# Patient Record
Sex: MALE | Race: WHITE | URBAN - METROPOLITAN AREA
[De-identification: names, ages, dates, MRNs, and addresses within clinical notes are randomized per-mention and may not be internally consistent; named-entity substitution may affect disease eponyms.]

---

## 2022-07-09 ENCOUNTER — TELEPHONE ENCOUNTER (OUTPATIENT)
Dept: URBAN - METROPOLITAN AREA CLINIC 121 | Facility: CLINIC | Age: 82
End: 2022-07-09

## 2022-07-10 ENCOUNTER — TELEPHONE ENCOUNTER (OUTPATIENT)
Dept: URBAN - METROPOLITAN AREA CLINIC 121 | Facility: CLINIC | Age: 82
End: 2022-07-10

## 2022-07-10 RX ORDER — ATENOLOL 50 MG/1
TABLET ORAL
Refills: 0 | Status: ACTIVE | COMMUNITY
Start: 2010-07-27

## 2022-07-10 RX ORDER — ASPIRIN 81 MG/1
TABLET, COATED ORAL
Refills: 0 | Status: ACTIVE | COMMUNITY
Start: 2010-07-27

## 2022-07-10 RX ORDER — SEVELAMER CARBONATE 800 MG/1
TABLET, FILM COATED ORAL
Refills: 0 | Status: ACTIVE | COMMUNITY
Start: 2010-07-27

## 2022-07-10 RX ORDER — LISINOPRIL 10 MG/1
TABLET ORAL
Refills: 0 | Status: ACTIVE | COMMUNITY
Start: 2010-07-27

## 2022-07-10 RX ORDER — LANTHANUM CARBONATE 1000 MG/1
TABLET, CHEWABLE ORAL
Refills: 0 | Status: ACTIVE | COMMUNITY
Start: 2010-07-27

## 2023-05-02 ENCOUNTER — P2P PATIENT RECORD (OUTPATIENT)
Age: 83
End: 2023-05-02

## 2023-05-03 ENCOUNTER — TELEPHONE ENCOUNTER (OUTPATIENT)
Dept: URBAN - METROPOLITAN AREA CLINIC 60 | Facility: CLINIC | Age: 83
End: 2023-05-03

## 2023-05-09 PROBLEM — 19943007: Status: ACTIVE | Noted: 2023-05-09

## 2023-05-10 ENCOUNTER — LAB OUTSIDE AN ENCOUNTER (OUTPATIENT)
Dept: URBAN - METROPOLITAN AREA CLINIC 63 | Facility: CLINIC | Age: 83
End: 2023-05-10

## 2023-05-10 ENCOUNTER — WEB ENCOUNTER (OUTPATIENT)
Dept: URBAN - METROPOLITAN AREA CLINIC 63 | Facility: CLINIC | Age: 83
End: 2023-05-10

## 2023-05-10 ENCOUNTER — OFFICE VISIT (OUTPATIENT)
Dept: URBAN - METROPOLITAN AREA CLINIC 63 | Facility: CLINIC | Age: 83
End: 2023-05-10
Payer: MEDICARE

## 2023-05-10 VITALS
WEIGHT: 212.6 LBS | BODY MASS INDEX: 33.37 KG/M2 | SYSTOLIC BLOOD PRESSURE: 120 MMHG | OXYGEN SATURATION: 94 % | TEMPERATURE: 97.8 F | HEIGHT: 67 IN | DIASTOLIC BLOOD PRESSURE: 70 MMHG | HEART RATE: 51 BPM

## 2023-05-10 DIAGNOSIS — K74.69 OTHER CIRRHOSIS OF LIVER: ICD-10-CM

## 2023-05-10 DIAGNOSIS — R19.5 POSITIVE COLORECTAL CANCER SCREENING USING COLOGUARD TEST: ICD-10-CM

## 2023-05-10 DIAGNOSIS — K86.89 PANCREATIC CALCIFICATION: ICD-10-CM

## 2023-05-10 DIAGNOSIS — K85.80 OTHER ACUTE PANCREATITIS WITHOUT INFECTION OR NECROSIS: ICD-10-CM

## 2023-05-10 DIAGNOSIS — Z86.010 PERSONAL HISTORY OF COLONIC POLYPS: ICD-10-CM

## 2023-05-10 PROBLEM — 428283002: Status: ACTIVE | Noted: 2023-05-10

## 2023-05-10 PROCEDURE — 99204 OFFICE O/P NEW MOD 45 MIN: CPT | Performed by: PHYSICIAN ASSISTANT

## 2023-05-10 RX ORDER — CALCIUM CARBONATE/VITAMIN D3 500MG-5MCG
TAKE 1 TABLET BY MOUTH EVERY DAY TABLET ORAL
Qty: 90 EACH | Refills: 1 | Status: ACTIVE | COMMUNITY

## 2023-05-10 RX ORDER — ALENDRONATE SODIUM 70 MG/1
TABLET ORAL
Qty: 12 TABLET | Status: ACTIVE | COMMUNITY

## 2023-05-10 RX ORDER — GLIPIZIDE 5 MG/1
TABLET ORAL
Qty: 90 TABLET | Status: ACTIVE | COMMUNITY

## 2023-05-10 RX ORDER — PREDNISONE 5 MG/1
TAKE 1 TABLET BY MOUTH EVERY DAY TABLET ORAL
Qty: 30 EACH | Refills: 1 | Status: ACTIVE | COMMUNITY

## 2023-05-10 RX ORDER — DOXAZOSIN 4 MG/1
TAKE 1/2 TABLET BY MOUTH TWICE DAILY TABLET ORAL
Qty: 90 EACH | Refills: 0 | Status: ACTIVE | COMMUNITY

## 2023-05-10 RX ORDER — ASPIRIN 81 MG/1
TAKE 1 TABLET BY MOUTH EVERY DAY TABLET, COATED ORAL
Qty: 90 EACH | Refills: 0 | Status: ACTIVE | COMMUNITY

## 2023-05-10 RX ORDER — MAGNESIUM OXIDE 400 MG/1
TAKE 1 TABLET BY MOUTH TWICE DAILY TABLET ORAL
Qty: 60 EACH | Refills: 0 | Status: ACTIVE | COMMUNITY

## 2023-05-10 RX ORDER — TESTOSTERONE CYPIONATE 200 MG/ML
INJECT 1ML IN THE MUSCLE EVERY 2 WEEKS INJECTION, SOLUTION INTRAMUSCULAR
Qty: 2 MILLILITER | Refills: 2 | Status: ACTIVE | COMMUNITY

## 2023-05-10 RX ORDER — POTASSIUM CHLORIDE 10 MEQ/1
CAPSULE, COATED, EXTENDED RELEASE ORAL
Qty: 90 CAPSULE | Status: ACTIVE | COMMUNITY

## 2023-05-10 RX ORDER — AMLODIPINE BESYLATE 10 MG/1
TAKE ONE TABLET BY MOUTH ONE TIME DAILY TABLET ORAL
Qty: 30 UNSPECIFIED | Refills: 1 | Status: ACTIVE | COMMUNITY

## 2023-05-10 RX ORDER — MYCOPHENOLIC ACID 360 MG/1
TAKE 1 TABLET BY MOUTH TWICE DAILY TABLET, DELAYED RELEASE ORAL
Qty: 60 EACH | Refills: 2 | Status: ACTIVE | COMMUNITY

## 2023-05-10 RX ORDER — HYDRALAZINE HYDROCHLORIDE 50 MG/1
TABLET ORAL
Qty: 540 TABLET | Status: ACTIVE | COMMUNITY

## 2023-05-10 RX ORDER — ALLOPURINOL 100 MG/1
TAKE ONE TABLET BY MOUTH ONE TIME DAILY TABLET ORAL
Qty: 90 UNSPECIFIED | Refills: 0 | Status: ACTIVE | COMMUNITY

## 2023-05-10 RX ORDER — POLYETHYLENE GLYCOL 3350, SODIUM CHLORIDE, SODIUM BICARBONATE, POTASSIUM CHLORIDE 420; 11.2; 5.72; 1.48 G/4L; G/4L; G/4L; G/4L
AS DIRECTED POWDER, FOR SOLUTION ORAL ONCE
Qty: 4000 | Refills: 0 | OUTPATIENT
Start: 2023-05-10 | End: 2023-05-11

## 2023-05-10 RX ORDER — TACROLIMUS 0.5 MG/1
TAKE ONE CAPSULE BY MOUTH TWICE DAILY CAPSULE ORAL
Qty: 60 EACH | Refills: 1 | Status: ACTIVE | COMMUNITY

## 2023-05-10 RX ORDER — ONDANSETRON HYDROCHLORIDE 4 MG/1
1 TABLET TABLET, FILM COATED ORAL
Qty: 2 | Refills: 0 | OUTPATIENT
Start: 2023-05-10

## 2023-05-10 NOTE — HPI-TODAY'S VISIT:
Roddy is a pleasant 82-year-old male who presents today for evaluation of a positive Cologuard  Underwent EGD/colonoscopy in August 2010 with Dr. Owen.  Normal duodenum.  Gastritis.  GE junction appeared to be normal.  Diverticulosis in the sigmoid colon.  3 polyps measuring between 10 and 20 mm in size removed from the splenic flexure and descending colon.  Chronic inactive gastritis.  Negative for H. pylori.  Fragments of tubular adenoma removed from the hepatic flexure.  Labs dated 3/7/2023 showed a normal hemoglobin.  Normal platelets.  Normal renal function.  Normal LFTs.  Normal TSH.  Iron studies normal.  Vitamin B12 normal.  Ferritin normal.  CT abdomen/pelvis without contrast dated 6/10/2022 showed no evidence of acute abdominal or pelvic pathology  CT abdomen/pelvis without contrast dated 5/9/2022 showed peripancreatic inflammation consistent with pancreatitis with no fluid collections.  Small infiltrate in the right lobe may be pneumonia or atelectasis.  Cirrhotic liver with mild splenomegaly.  1 cm nonobstructing calculus and atrophic right kidney  Notes 1-2 daily bowel movements without constipation or diarrhea. Denies nausea, vomiting, heartburn, reflux, dysphagia, odynophagia, hematemesis, coffee ground emesis, abdominal pain, change in bowel habits, abnormal weight loss, BRBPR or melena. Denies family history of colon cancer or colon polyps. No other GI complaints at this time. Denies EtOH use.

## 2023-05-17 ENCOUNTER — LAB OUTSIDE AN ENCOUNTER (OUTPATIENT)
Dept: URBAN - METROPOLITAN AREA CLINIC 63 | Facility: CLINIC | Age: 83
End: 2023-05-17

## 2023-05-30 ENCOUNTER — TELEPHONE ENCOUNTER (OUTPATIENT)
Dept: URBAN - METROPOLITAN AREA CLINIC 63 | Facility: CLINIC | Age: 83
End: 2023-05-30

## 2023-05-30 ENCOUNTER — LAB OUTSIDE AN ENCOUNTER (OUTPATIENT)
Dept: URBAN - METROPOLITAN AREA CLINIC 63 | Facility: CLINIC | Age: 83
End: 2023-05-30

## 2023-06-08 ENCOUNTER — TELEPHONE ENCOUNTER (OUTPATIENT)
Dept: URBAN - METROPOLITAN AREA CLINIC 63 | Facility: CLINIC | Age: 83
End: 2023-06-08

## 2023-06-09 ENCOUNTER — CLAIMS CREATED FROM THE CLAIM WINDOW (OUTPATIENT)
Dept: URBAN - METROPOLITAN AREA SURGERY CENTER 4 | Facility: SURGERY CENTER | Age: 83
End: 2023-06-09
Payer: MEDICARE

## 2023-06-09 ENCOUNTER — CLAIMS CREATED FROM THE CLAIM WINDOW (OUTPATIENT)
Dept: URBAN - METROPOLITAN AREA CLINIC 4 | Facility: CLINIC | Age: 83
End: 2023-06-09
Payer: MEDICARE

## 2023-06-09 DIAGNOSIS — D12.4 BENIGN NEOPLASM OF DESCENDING COLON: ICD-10-CM

## 2023-06-09 DIAGNOSIS — Z12.11 COLON CANCER SCREENING: ICD-10-CM

## 2023-06-09 DIAGNOSIS — D12.0 ADENOMATOUS POLYP OF CECUM: ICD-10-CM

## 2023-06-09 DIAGNOSIS — D12.5 BENIGN NEOPLASM OF SIGMOID COLON: ICD-10-CM

## 2023-06-09 DIAGNOSIS — D12.4 ADENOMATOUS POLYP OF DESCENDING COLON: ICD-10-CM

## 2023-06-09 DIAGNOSIS — D12.5 ADENOMATOUS POLYP OF SIGMOID COLON: ICD-10-CM

## 2023-06-09 DIAGNOSIS — D12.0 BENIGN NEOPLASM OF CECUM: ICD-10-CM

## 2023-06-09 DIAGNOSIS — Z12.11 COLON CANCER SCREENING (HIGH RISK): ICD-10-CM

## 2023-06-09 PROCEDURE — 45381 COLONOSCOPY SUBMUCOUS NJX: CPT | Performed by: CLINIC/CENTER

## 2023-06-09 PROCEDURE — 99100 ANES PT EXTEME AGE<1 YR&>70: CPT | Performed by: NURSE ANESTHETIST, CERTIFIED REGISTERED

## 2023-06-09 PROCEDURE — 88305 TISSUE EXAM BY PATHOLOGIST: CPT | Performed by: PATHOLOGY

## 2023-06-09 PROCEDURE — 00811 ANES LWR INTST NDSC NOS: CPT | Performed by: NURSE ANESTHETIST, CERTIFIED REGISTERED

## 2023-06-09 PROCEDURE — 45385 COLONOSCOPY W/LESION REMOVAL: CPT | Performed by: INTERNAL MEDICINE

## 2023-06-09 PROCEDURE — 45380 COLONOSCOPY AND BIOPSY: CPT | Performed by: CLINIC/CENTER

## 2023-06-09 PROCEDURE — 45385 COLONOSCOPY W/LESION REMOVAL: CPT | Performed by: CLINIC/CENTER

## 2023-06-09 PROCEDURE — 45381 COLONOSCOPY SUBMUCOUS NJX: CPT | Performed by: INTERNAL MEDICINE

## 2023-06-09 PROCEDURE — 45380 COLONOSCOPY AND BIOPSY: CPT | Performed by: INTERNAL MEDICINE

## 2023-06-09 RX ORDER — POTASSIUM CHLORIDE 10 MEQ/1
CAPSULE, COATED, EXTENDED RELEASE ORAL
Qty: 90 CAPSULE | Status: ACTIVE | COMMUNITY

## 2023-06-09 RX ORDER — ASPIRIN 81 MG/1
TAKE 1 TABLET BY MOUTH EVERY DAY TABLET, COATED ORAL
Qty: 90 EACH | Refills: 0 | Status: ACTIVE | COMMUNITY

## 2023-06-09 RX ORDER — ALENDRONATE SODIUM 70 MG/1
TABLET ORAL
Qty: 12 TABLET | Status: ACTIVE | COMMUNITY

## 2023-06-09 RX ORDER — TESTOSTERONE CYPIONATE 200 MG/ML
INJECT 1ML IN THE MUSCLE EVERY 2 WEEKS INJECTION, SOLUTION INTRAMUSCULAR
Qty: 2 MILLILITER | Refills: 2 | Status: ACTIVE | COMMUNITY

## 2023-06-09 RX ORDER — HYDRALAZINE HYDROCHLORIDE 50 MG/1
TABLET ORAL
Qty: 540 TABLET | Status: ACTIVE | COMMUNITY

## 2023-06-09 RX ORDER — TACROLIMUS 0.5 MG/1
TAKE ONE CAPSULE BY MOUTH TWICE DAILY CAPSULE ORAL
Qty: 60 EACH | Refills: 1 | Status: ACTIVE | COMMUNITY

## 2023-06-09 RX ORDER — CALCIUM CARBONATE/VITAMIN D3 500MG-5MCG
TAKE 1 TABLET BY MOUTH EVERY DAY TABLET ORAL
Qty: 90 EACH | Refills: 1 | Status: ACTIVE | COMMUNITY

## 2023-06-09 RX ORDER — ONDANSETRON HYDROCHLORIDE 4 MG/1
1 TABLET TABLET, FILM COATED ORAL
Qty: 2 | Refills: 0 | Status: ACTIVE | COMMUNITY
Start: 2023-05-10

## 2023-06-09 RX ORDER — GLIPIZIDE 5 MG/1
TABLET ORAL
Qty: 90 TABLET | Status: ACTIVE | COMMUNITY

## 2023-06-09 RX ORDER — MYCOPHENOLIC ACID 360 MG/1
TAKE 1 TABLET BY MOUTH TWICE DAILY TABLET, DELAYED RELEASE ORAL
Qty: 60 EACH | Refills: 2 | Status: ACTIVE | COMMUNITY

## 2023-06-09 RX ORDER — PREDNISONE 5 MG/1
TAKE 1 TABLET BY MOUTH EVERY DAY TABLET ORAL
Qty: 30 EACH | Refills: 1 | Status: ACTIVE | COMMUNITY

## 2023-06-09 RX ORDER — ALLOPURINOL 100 MG/1
TAKE ONE TABLET BY MOUTH ONE TIME DAILY TABLET ORAL
Qty: 90 UNSPECIFIED | Refills: 0 | Status: ACTIVE | COMMUNITY

## 2023-06-09 RX ORDER — DOXAZOSIN 4 MG/1
TAKE 1/2 TABLET BY MOUTH TWICE DAILY TABLET ORAL
Qty: 90 EACH | Refills: 0 | Status: ACTIVE | COMMUNITY

## 2023-06-09 RX ORDER — AMLODIPINE BESYLATE 10 MG/1
TAKE ONE TABLET BY MOUTH ONE TIME DAILY TABLET ORAL
Qty: 30 UNSPECIFIED | Refills: 1 | Status: ACTIVE | COMMUNITY

## 2023-06-09 RX ORDER — MAGNESIUM OXIDE 400 MG/1
TAKE 1 TABLET BY MOUTH TWICE DAILY TABLET ORAL
Qty: 60 EACH | Refills: 0 | Status: ACTIVE | COMMUNITY

## 2023-06-14 ENCOUNTER — OFFICE VISIT (OUTPATIENT)
Dept: URBAN - METROPOLITAN AREA MEDICAL CENTER 14 | Facility: MEDICAL CENTER | Age: 83
End: 2023-06-14
Payer: MEDICARE

## 2023-06-14 ENCOUNTER — TELEPHONE ENCOUNTER (OUTPATIENT)
Dept: URBAN - METROPOLITAN AREA CLINIC 63 | Facility: CLINIC | Age: 83
End: 2023-06-14

## 2023-06-14 DIAGNOSIS — K29.00 ACUTE GASTRITIS WITHOUT BLEEDING: ICD-10-CM

## 2023-06-14 DIAGNOSIS — K86.2 PANCREATIC CYST: ICD-10-CM

## 2023-06-14 DIAGNOSIS — K74.69 OTHER CIRRHOSIS OF LIVER: ICD-10-CM

## 2023-06-14 PROCEDURE — 43238 EGD US FINE NEEDLE BX/ASPIR: CPT | Performed by: INTERNAL MEDICINE

## 2023-06-14 PROCEDURE — 43239 EGD BIOPSY SINGLE/MULTIPLE: CPT | Performed by: INTERNAL MEDICINE

## 2023-06-14 RX ORDER — ONDANSETRON HYDROCHLORIDE 4 MG/1
1 TABLET TABLET, FILM COATED ORAL
Qty: 2 | Refills: 0 | Status: ACTIVE | COMMUNITY
Start: 2023-05-10

## 2023-06-14 RX ORDER — AMLODIPINE BESYLATE 10 MG/1
TAKE ONE TABLET BY MOUTH ONE TIME DAILY TABLET ORAL
Qty: 30 UNSPECIFIED | Refills: 1 | Status: ACTIVE | COMMUNITY

## 2023-06-14 RX ORDER — MYCOPHENOLIC ACID 360 MG/1
TAKE 1 TABLET BY MOUTH TWICE DAILY TABLET, DELAYED RELEASE ORAL
Qty: 60 EACH | Refills: 2 | Status: ACTIVE | COMMUNITY

## 2023-06-14 RX ORDER — ALLOPURINOL 100 MG/1
TAKE ONE TABLET BY MOUTH ONE TIME DAILY TABLET ORAL
Qty: 90 UNSPECIFIED | Refills: 0 | Status: ACTIVE | COMMUNITY

## 2023-06-14 RX ORDER — GLIPIZIDE 5 MG/1
TABLET ORAL
Qty: 90 TABLET | Status: ACTIVE | COMMUNITY

## 2023-06-14 RX ORDER — MAGNESIUM OXIDE 400 MG/1
TAKE 1 TABLET BY MOUTH TWICE DAILY TABLET ORAL
Qty: 60 EACH | Refills: 0 | Status: ACTIVE | COMMUNITY

## 2023-06-14 RX ORDER — CALCIUM CARBONATE/VITAMIN D3 500MG-5MCG
TAKE 1 TABLET BY MOUTH EVERY DAY TABLET ORAL
Qty: 90 EACH | Refills: 1 | Status: ACTIVE | COMMUNITY

## 2023-06-14 RX ORDER — HYDRALAZINE HYDROCHLORIDE 50 MG/1
TABLET ORAL
Qty: 540 TABLET | Status: ACTIVE | COMMUNITY

## 2023-06-14 RX ORDER — DOXAZOSIN 4 MG/1
TAKE 1/2 TABLET BY MOUTH TWICE DAILY TABLET ORAL
Qty: 90 EACH | Refills: 0 | Status: ACTIVE | COMMUNITY

## 2023-06-14 RX ORDER — TESTOSTERONE CYPIONATE 200 MG/ML
INJECT 1ML IN THE MUSCLE EVERY 2 WEEKS INJECTION, SOLUTION INTRAMUSCULAR
Qty: 2 MILLILITER | Refills: 2 | Status: ACTIVE | COMMUNITY

## 2023-06-14 RX ORDER — PREDNISONE 5 MG/1
TAKE 1 TABLET BY MOUTH EVERY DAY TABLET ORAL
Qty: 30 EACH | Refills: 1 | Status: ACTIVE | COMMUNITY

## 2023-06-14 RX ORDER — ASPIRIN 81 MG/1
TAKE 1 TABLET BY MOUTH EVERY DAY TABLET, COATED ORAL
Qty: 90 EACH | Refills: 0 | Status: ACTIVE | COMMUNITY

## 2023-06-14 RX ORDER — ALENDRONATE SODIUM 70 MG/1
TABLET ORAL
Qty: 12 TABLET | Status: ACTIVE | COMMUNITY

## 2023-06-14 RX ORDER — TACROLIMUS 0.5 MG/1
TAKE ONE CAPSULE BY MOUTH TWICE DAILY CAPSULE ORAL
Qty: 60 EACH | Refills: 1 | Status: ACTIVE | COMMUNITY

## 2023-06-14 RX ORDER — POTASSIUM CHLORIDE 10 MEQ/1
CAPSULE, COATED, EXTENDED RELEASE ORAL
Qty: 90 CAPSULE | Status: ACTIVE | COMMUNITY

## 2023-06-30 ENCOUNTER — LAB OUTSIDE AN ENCOUNTER (OUTPATIENT)
Dept: URBAN - METROPOLITAN AREA CLINIC 63 | Facility: CLINIC | Age: 83
End: 2023-06-30

## 2023-06-30 ENCOUNTER — OFFICE VISIT (OUTPATIENT)
Dept: URBAN - METROPOLITAN AREA CLINIC 63 | Facility: CLINIC | Age: 83
End: 2023-06-30
Payer: MEDICARE

## 2023-06-30 ENCOUNTER — TELEPHONE ENCOUNTER (OUTPATIENT)
Dept: URBAN - METROPOLITAN AREA CLINIC 63 | Facility: CLINIC | Age: 83
End: 2023-06-30

## 2023-06-30 ENCOUNTER — WEB ENCOUNTER (OUTPATIENT)
Dept: URBAN - METROPOLITAN AREA CLINIC 63 | Facility: CLINIC | Age: 83
End: 2023-06-30

## 2023-06-30 VITALS
SYSTOLIC BLOOD PRESSURE: 120 MMHG | DIASTOLIC BLOOD PRESSURE: 80 MMHG | BODY MASS INDEX: 33.34 KG/M2 | HEIGHT: 67 IN | OXYGEN SATURATION: 95 % | HEART RATE: 65 BPM | TEMPERATURE: 97.4 F | WEIGHT: 212.4 LBS

## 2023-06-30 DIAGNOSIS — K86.89 PANCREATIC CALCIFICATION: ICD-10-CM

## 2023-06-30 DIAGNOSIS — Z11.59 SCREENING FOR VIRAL DISEASE: ICD-10-CM

## 2023-06-30 DIAGNOSIS — R19.5 POSITIVE COLORECTAL CANCER SCREENING USING COLOGUARD TEST: ICD-10-CM

## 2023-06-30 DIAGNOSIS — K85.80 OTHER ACUTE PANCREATITIS WITHOUT INFECTION OR NECROSIS: ICD-10-CM

## 2023-06-30 DIAGNOSIS — Z86.010 PERSONAL HISTORY OF COLONIC POLYPS: ICD-10-CM

## 2023-06-30 DIAGNOSIS — K22.70 BARRETT'S ESOPHAGUS WITHOUT DYSPLASIA: ICD-10-CM

## 2023-06-30 DIAGNOSIS — Z12.11 ENCOUNTER FOR SCREENING FOR MALIGNANT NEOPLASM OF COLON: ICD-10-CM

## 2023-06-30 DIAGNOSIS — K74.69 OTHER CIRRHOSIS OF LIVER: ICD-10-CM

## 2023-06-30 DIAGNOSIS — K86.2 PANCREATIC CYST: ICD-10-CM

## 2023-06-30 PROBLEM — 302914006: Status: ACTIVE | Noted: 2023-06-30

## 2023-06-30 PROCEDURE — 99215 OFFICE O/P EST HI 40 MIN: CPT | Performed by: PHYSICIAN ASSISTANT

## 2023-06-30 RX ORDER — PREDNISONE 5 MG/1
TAKE 1 TABLET BY MOUTH EVERY DAY TABLET ORAL
Qty: 30 EACH | Refills: 1 | Status: ACTIVE | COMMUNITY

## 2023-06-30 RX ORDER — ASPIRIN 81 MG/1
TAKE 1 TABLET BY MOUTH EVERY DAY TABLET, COATED ORAL
Qty: 90 EACH | Refills: 0 | Status: ACTIVE | COMMUNITY

## 2023-06-30 RX ORDER — CALCIUM CARBONATE/VITAMIN D3 500MG-5MCG
TAKE 1 TABLET BY MOUTH EVERY DAY TABLET ORAL
Qty: 90 EACH | Refills: 1 | Status: ACTIVE | COMMUNITY

## 2023-06-30 RX ORDER — HYDRALAZINE HYDROCHLORIDE 50 MG/1
TABLET ORAL
Qty: 540 TABLET | Status: ACTIVE | COMMUNITY

## 2023-06-30 RX ORDER — POTASSIUM CHLORIDE 10 MEQ/1
CAPSULE, COATED, EXTENDED RELEASE ORAL
Qty: 90 CAPSULE | Status: ACTIVE | COMMUNITY

## 2023-06-30 RX ORDER — MYCOPHENOLIC ACID 360 MG/1
TAKE 1 TABLET BY MOUTH TWICE DAILY TABLET, DELAYED RELEASE ORAL
Qty: 60 EACH | Refills: 2 | Status: ACTIVE | COMMUNITY

## 2023-06-30 RX ORDER — OMEPRAZOLE 20 MG/1
1 CAPSULE 30 MINUTES BEFORE MORNING MEAL CAPSULE, DELAYED RELEASE ORAL ONCE A DAY
Qty: 90 CAPSULE | Refills: 3 | OUTPATIENT
Start: 2023-06-30

## 2023-06-30 RX ORDER — TORSEMIDE 20 MG/1
AS DIRECTED TABLET ORAL
Status: ACTIVE | COMMUNITY
Start: 2023-06-30

## 2023-06-30 RX ORDER — GLIPIZIDE 5 MG/1
TABLET ORAL
Qty: 90 TABLET | Status: ACTIVE | COMMUNITY

## 2023-06-30 RX ORDER — ALENDRONATE SODIUM 70 MG/1
TABLET ORAL
Qty: 12 TABLET | Status: ACTIVE | COMMUNITY

## 2023-06-30 RX ORDER — AMLODIPINE BESYLATE 10 MG/1
TAKE ONE TABLET BY MOUTH ONE TIME DAILY TABLET ORAL
Qty: 30 UNSPECIFIED | Refills: 1 | Status: ACTIVE | COMMUNITY

## 2023-06-30 RX ORDER — TACROLIMUS 0.5 MG/1
TAKE ONE CAPSULE BY MOUTH TWICE DAILY CAPSULE ORAL
Qty: 60 EACH | Refills: 1 | Status: ACTIVE | COMMUNITY

## 2023-06-30 RX ORDER — TESTOSTERONE CYPIONATE 200 MG/ML
INJECT 1ML IN THE MUSCLE EVERY 2 WEEKS INJECTION, SOLUTION INTRAMUSCULAR
Qty: 2 MILLILITER | Refills: 2 | Status: ACTIVE | COMMUNITY

## 2023-06-30 RX ORDER — DOXAZOSIN 4 MG/1
TAKE 1/2 TABLET BY MOUTH TWICE DAILY TABLET ORAL
Qty: 90 EACH | Refills: 0 | Status: ACTIVE | COMMUNITY

## 2023-06-30 RX ORDER — MAGNESIUM OXIDE 400 MG/1
TAKE 1 TABLET BY MOUTH TWICE DAILY TABLET ORAL
Qty: 60 EACH | Refills: 0 | Status: ACTIVE | COMMUNITY

## 2023-06-30 RX ORDER — ALLOPURINOL 100 MG/1
TAKE ONE TABLET BY MOUTH ONE TIME DAILY TABLET ORAL
Qty: 90 UNSPECIFIED | Refills: 0 | Status: ACTIVE | COMMUNITY

## 2023-06-30 NOTE — HPI-TODAY'S VISIT:
Roddy is a pleasant 82-year-old male who presents today for a procedure follow up.  MRI abdomen/MRCP with and without contrast dated 5/22/2023 showed cirrhosis without suspicious liver mass.  Recommend routine imaging surveillance for HCC in 6 months.  Branching 3.2 cm cystic lesion in the body of the pancreas is most consistent with a sidebranch IPMN.  Size greater than 3 cm is a worrisome feature.  No high risk stigmata.  Recommend further evaluation with a EUS versus short interval follow-up MRI pancreas.  Mild sequela of portal hypertension include mild splenomegaly and small volume scattered free fluid.  Patent portal vein  Colonoscopy dated 6/9/23 showed two diminutive polyps removed from the cecum. 6 mm polyp removed from the cecum. One clip was placed. 7 mm polyp removed from the cecum. Clip was placed. Four diminutive polyps removed from the mid descending colon. 8 mm polyp removed from the distal descending colon in piecemeal fashion. Two 7-8 mm polyps removed from the distal descending colon. Two 6-8 mm polyps removed from the mid sigmoid colon. Diminutive polyp removed from the mid sigmoid colon. Diverticulosis in the sigmoid colon and non bleeding internal hemorrhoids present upon retroflexion. Repeat colonoscopy in 6 months. All polyps consistent with tubular adenomas  EGD/EUS dated 6/14/2023.  Anechoic lesion in the body of the pancreas consistent with pancreatic cyst without any worrisome features septated in nature measuring approximately 2.5 to 2.6 x 1.5 to 1.6 cm status post FNA of approximately 1 mL of clear, slightly blood-tinged fluid but could not definitively appreciate PD connection.  Minimal inflammation at the GE junction.  Mild gastritis.  Will refer to Dr. Nelson for further evaluation.  CEA in the fluid 167.2.  Gastric antral and fundic type mucosa with reactive gastropathy mild chronic gastritis.  Negative for H. pylori.  GE junction biopsies with mild acute chronic gastritis and focal intestinal metaplasia.  No dysplasia or malignancy seen  Labs dated 6/5/2023 showed a normal hemoglobin.  Platelets 131.  PT 15.6, INR 1.27.  Normal CMP.  aFP normal.  Vitamin B12 normal.  TSH normal.  Hepatitis panel negative.  Labs dated 3/7/2023 showed a normal hemoglobin.  Normal platelets.  Normal renal function.  Normal LFTs.  Normal TSH.  Iron studies normal.  Vitamin B12 normal.  Ferritin normal.  CT abdomen/pelvis without contrast dated 6/10/2022 showed no evidence of acute abdominal or pelvic pathology  CT abdomen/pelvis without contrast dated 5/9/2022 showed peripancreatic inflammation consistent with pancreatitis with no fluid collections.  Small infiltrate in the right lobe may be pneumonia or atelectasis.  Cirrhotic liver with mild splenomegaly.  1 cm nonobstructing calculus and atrophic right kidney  No issues after procedure. Notes 1-2 daily bowel movements without constipation or diarrhea. Denies nausea, vomiting, heartburn, reflux, dysphagia, odynophagia, hematemesis, coffee ground emesis, abdominal pain, change in bowel habits, abnormal weight loss, BRBPR or melena. Denies family history of colon cancer or colon polyps. No other GI complaints at this time. Denies EtOH use. MELD Na score of 10

## 2023-07-10 ENCOUNTER — TELEPHONE ENCOUNTER (OUTPATIENT)
Dept: URBAN - METROPOLITAN AREA CLINIC 63 | Facility: CLINIC | Age: 83
End: 2023-07-10

## 2023-09-13 ENCOUNTER — OFFICE VISIT (OUTPATIENT)
Dept: URBAN - METROPOLITAN AREA CLINIC 63 | Facility: CLINIC | Age: 83
End: 2023-09-13
Payer: MEDICARE

## 2023-09-13 VITALS
HEART RATE: 53 BPM | SYSTOLIC BLOOD PRESSURE: 110 MMHG | TEMPERATURE: 97.4 F | BODY MASS INDEX: 33.27 KG/M2 | WEIGHT: 212 LBS | HEIGHT: 67 IN | OXYGEN SATURATION: 95 % | DIASTOLIC BLOOD PRESSURE: 60 MMHG

## 2023-09-13 DIAGNOSIS — Z12.11 ENCOUNTER FOR SCREENING FOR MALIGNANT NEOPLASM OF COLON: ICD-10-CM

## 2023-09-13 DIAGNOSIS — K22.70 BARRETT'S ESOPHAGUS WITHOUT DYSPLASIA: ICD-10-CM

## 2023-09-13 DIAGNOSIS — Z11.59 SCREENING FOR VIRAL DISEASE: ICD-10-CM

## 2023-09-13 DIAGNOSIS — Z86.010 PERSONAL HISTORY OF COLONIC POLYPS: ICD-10-CM

## 2023-09-13 DIAGNOSIS — K86.2 PANCREATIC CYST: ICD-10-CM

## 2023-09-13 DIAGNOSIS — K74.69 OTHER CIRRHOSIS OF LIVER: ICD-10-CM

## 2023-09-13 PROCEDURE — 99214 OFFICE O/P EST MOD 30 MIN: CPT | Performed by: PHYSICIAN ASSISTANT

## 2023-09-13 RX ORDER — TESTOSTERONE CYPIONATE 200 MG/ML
INJECT 1ML IN THE MUSCLE EVERY 2 WEEKS INJECTION, SOLUTION INTRAMUSCULAR
Qty: 2 MILLILITER | Refills: 2 | Status: ACTIVE | COMMUNITY

## 2023-09-13 RX ORDER — OMEPRAZOLE 20 MG/1
1 CAPSULE 30 MINUTES BEFORE MORNING MEAL CAPSULE, DELAYED RELEASE ORAL ONCE A DAY
Qty: 90 CAPSULE | Refills: 3 | Status: ACTIVE | COMMUNITY
Start: 2023-06-30

## 2023-09-13 RX ORDER — ALLOPURINOL 100 MG/1
TAKE ONE TABLET BY MOUTH ONE TIME DAILY TABLET ORAL
Qty: 90 UNSPECIFIED | Refills: 0 | Status: ACTIVE | COMMUNITY

## 2023-09-13 RX ORDER — ASPIRIN 81 MG/1
TAKE 1 TABLET BY MOUTH EVERY DAY TABLET, COATED ORAL
Qty: 90 EACH | Refills: 0 | Status: ACTIVE | COMMUNITY

## 2023-09-13 RX ORDER — GLIPIZIDE 5 MG/1
TABLET ORAL
Qty: 90 TABLET | Status: ACTIVE | COMMUNITY

## 2023-09-13 RX ORDER — PREDNISONE 5 MG/1
TAKE 1 TABLET BY MOUTH EVERY DAY TABLET ORAL
Qty: 30 EACH | Refills: 1 | Status: ACTIVE | COMMUNITY

## 2023-09-13 RX ORDER — ALENDRONATE SODIUM 70 MG/1
TABLET ORAL
Qty: 12 TABLET | Status: ACTIVE | COMMUNITY

## 2023-09-13 RX ORDER — AMLODIPINE BESYLATE 10 MG/1
TAKE ONE TABLET BY MOUTH ONE TIME DAILY TABLET ORAL
Qty: 30 UNSPECIFIED | Refills: 1 | Status: ACTIVE | COMMUNITY

## 2023-09-13 RX ORDER — ONDANSETRON HYDROCHLORIDE 4 MG/1
1 TABLET TABLET, FILM COATED ORAL
Qty: 2 | Refills: 0 | OUTPATIENT
Start: 2023-09-13

## 2023-09-13 RX ORDER — HYDRALAZINE HYDROCHLORIDE 50 MG/1
TABLET ORAL
Qty: 540 TABLET | Status: ACTIVE | COMMUNITY

## 2023-09-13 RX ORDER — MAGNESIUM OXIDE 400 MG/1
TAKE 1 TABLET BY MOUTH TWICE DAILY TABLET ORAL
Qty: 60 EACH | Refills: 0 | Status: ACTIVE | COMMUNITY

## 2023-09-13 RX ORDER — TACROLIMUS 0.5 MG/1
TAKE ONE CAPSULE BY MOUTH TWICE DAILY CAPSULE ORAL
Qty: 60 EACH | Refills: 1 | Status: ACTIVE | COMMUNITY

## 2023-09-13 RX ORDER — MYCOPHENOLIC ACID 360 MG/1
TAKE 1 TABLET BY MOUTH TWICE DAILY TABLET, DELAYED RELEASE ORAL
Qty: 60 EACH | Refills: 2 | Status: ACTIVE | COMMUNITY

## 2023-09-13 RX ORDER — POLYETHYLENE GLYCOL 3350, SODIUM CHLORIDE, SODIUM BICARBONATE, POTASSIUM CHLORIDE 420; 11.2; 5.72; 1.48 G/4L; G/4L; G/4L; G/4L
AS DIRECTED POWDER, FOR SOLUTION ORAL ONCE
Qty: 4000 | Refills: 0 | OUTPATIENT
Start: 2023-09-13 | End: 2023-09-14

## 2023-09-13 RX ORDER — POTASSIUM CHLORIDE 10 MEQ/1
CAPSULE, COATED, EXTENDED RELEASE ORAL
Qty: 90 CAPSULE | Status: ACTIVE | COMMUNITY

## 2023-09-13 RX ORDER — TORSEMIDE 20 MG/1
AS DIRECTED TABLET ORAL
Status: ACTIVE | COMMUNITY
Start: 2023-06-30

## 2023-09-13 RX ORDER — DOXAZOSIN 4 MG/1
TAKE 1/2 TABLET BY MOUTH TWICE DAILY TABLET ORAL
Qty: 90 EACH | Refills: 0 | Status: ACTIVE | COMMUNITY

## 2023-09-13 RX ORDER — CALCIUM CARBONATE/VITAMIN D3 500MG-5MCG
TAKE 1 TABLET BY MOUTH EVERY DAY TABLET ORAL
Qty: 90 EACH | Refills: 1 | Status: ACTIVE | COMMUNITY

## 2023-09-13 NOTE — HPI-TODAY'S VISIT:
Roddy is a pleasant 82-year-old male who presents today for a follow up.  Patient was evaluated by surgical oncology for history of pancreatic cyst.  Plan is for surveillance.  Plan to repeat MRI 1 year from prior study  MRI abdomen with and without contrast dated 8/7/2023 showed no appreciable interval growth of the lobulated fluid signal lesion in the body of the pancreas estimated at 3.2 x 1.6 x 1.1 cm suspicious for sidebranch IPMN.  No solid enhancing components or pancreatic ductal dilatation.  Continued surveillance is recommended with pancreas protocol MRI abdomen with and without contrast in 6 months  Labs dated 9/6/2023 showed a normal hemoglobin.  Platelets 148.  PT 14.8, INR 1.1.  Normal renal function.  Normal LFTs.  TSH normal.  Vitamin B12 normal.  Ferritin normal.  Iron studies normal.  MRI abdomen/MRCP with and without contrast dated 5/22/2023 showed cirrhosis without suspicious liver mass.  Recommend routine imaging surveillance for HCC in 6 months.  Branching 3.2 cm cystic lesion in the body of the pancreas is most consistent with a sidebranch IPMN.  Size greater than 3 cm is a worrisome feature.  No high risk stigmata.  Recommend further evaluation with a EUS versus short interval follow-up MRI pancreas.  Mild sequela of portal hypertension include mild splenomegaly and small volume scattered free fluid.  Patent portal vein  Colonoscopy dated 6/9/23 showed two diminutive polyps removed from the cecum. 6 mm polyp removed from the cecum. One clip was placed. 7 mm polyp removed from the cecum. Clip was placed. Four diminutive polyps removed from the mid descending colon. 8 mm polyp removed from the distal descending colon in piecemeal fashion. Two 7-8 mm polyps removed from the distal descending colon. Two 6-8 mm polyps removed from the mid sigmoid colon. Diminutive polyp removed from the mid sigmoid colon. Diverticulosis in the sigmoid colon and non bleeding internal hemorrhoids present upon retroflexion. Repeat colonoscopy in 6 months. All polyps consistent with tubular adenomas  EGD/EUS dated 6/14/2023.  Anechoic lesion in the body of the pancreas consistent with pancreatic cyst without any worrisome features septated in nature measuring approximately 2.5 to 2.6 x 1.5 to 1.6 cm status post FNA of approximately 1 mL of clear, slightly blood-tinged fluid but could not definitively appreciate PD connection.  Minimal inflammation at the GE junction.  Mild gastritis.  Will refer to Dr. Nelson for further evaluation.  CEA in the fluid 167.2.  Gastric antral and fundic type mucosa with reactive gastropathy mild chronic gastritis.  Negative for H. pylori.  GE junction biopsies with mild acute chronic gastritis and focal intestinal metaplasia.  No dysplasia or malignancy seen  Labs dated 6/5/2023 showed a normal hemoglobin.  Platelets 131.  PT 15.6, INR 1.27.  Normal CMP.  aFP normal.  Vitamin B12 normal.  TSH normal.  Hepatitis panel negative.  Myriad genetic testing pending. Notes 1-2 daily bowel movements without constipation or diarrhea. Denies nausea, vomiting, heartburn, reflux, dysphagia, odynophagia, hematemesis, coffee ground emesis, abdominal pain, change in bowel habits, abnormal weight loss, BRBPR or melena. Denies family history of colon cancer or colon polyps. No other GI complaints at this time. Denies EtOH use. MELD Na score of 9

## 2023-09-20 ENCOUNTER — LAB OUTSIDE AN ENCOUNTER (OUTPATIENT)
Dept: URBAN - METROPOLITAN AREA CLINIC 63 | Facility: CLINIC | Age: 83
End: 2023-09-20

## 2023-12-11 ENCOUNTER — OFFICE VISIT (OUTPATIENT)
Dept: URBAN - METROPOLITAN AREA MEDICAL CENTER 3 | Facility: MEDICAL CENTER | Age: 83
End: 2023-12-11

## 2023-12-27 ENCOUNTER — OFFICE VISIT (OUTPATIENT)
Dept: URBAN - METROPOLITAN AREA CLINIC 63 | Facility: CLINIC | Age: 83
End: 2023-12-27

## 2024-01-03 ENCOUNTER — OFFICE VISIT (OUTPATIENT)
Dept: URBAN - METROPOLITAN AREA MEDICAL CENTER 14 | Facility: MEDICAL CENTER | Age: 84
End: 2024-01-03
Payer: MEDICARE

## 2024-01-03 DIAGNOSIS — Z86.010 COLON POLYP HISTORY: ICD-10-CM

## 2024-01-03 DIAGNOSIS — K63.5 COLON POLYPS: ICD-10-CM

## 2024-01-03 DIAGNOSIS — K57.30 DIVERTCULOSIS OF LG INT W/O PERFORATION OR ABSCESS W/O BLEEDING: ICD-10-CM

## 2024-01-03 DIAGNOSIS — K64.0 GRADE I HEMORRHOIDS: ICD-10-CM

## 2024-01-03 PROCEDURE — 45380 COLONOSCOPY AND BIOPSY: CPT | Performed by: INTERNAL MEDICINE

## 2024-01-03 PROCEDURE — 45385 COLONOSCOPY W/LESION REMOVAL: CPT | Performed by: INTERNAL MEDICINE

## 2024-01-03 RX ORDER — CALCIUM CARBONATE/VITAMIN D3 500MG-5MCG
TAKE 1 TABLET BY MOUTH EVERY DAY TABLET ORAL
Qty: 90 EACH | Refills: 1 | Status: ACTIVE | COMMUNITY

## 2024-01-03 RX ORDER — AMLODIPINE BESYLATE 10 MG/1
TAKE ONE TABLET BY MOUTH ONE TIME DAILY TABLET ORAL
Qty: 30 UNSPECIFIED | Refills: 1 | Status: ACTIVE | COMMUNITY

## 2024-01-03 RX ORDER — TORSEMIDE 20 MG/1
AS DIRECTED TABLET ORAL
Status: ACTIVE | COMMUNITY
Start: 2023-06-30

## 2024-01-03 RX ORDER — PREDNISONE 5 MG/1
TAKE 1 TABLET BY MOUTH EVERY DAY TABLET ORAL
Qty: 30 EACH | Refills: 1 | Status: ACTIVE | COMMUNITY

## 2024-01-03 RX ORDER — ALENDRONATE SODIUM 70 MG/1
TABLET ORAL
Qty: 12 TABLET | Status: ACTIVE | COMMUNITY

## 2024-01-03 RX ORDER — POTASSIUM CHLORIDE 10 MEQ/1
CAPSULE, COATED, EXTENDED RELEASE ORAL
Qty: 90 CAPSULE | Status: ACTIVE | COMMUNITY

## 2024-01-03 RX ORDER — OMEPRAZOLE 20 MG/1
1 CAPSULE 30 MINUTES BEFORE MORNING MEAL CAPSULE, DELAYED RELEASE ORAL ONCE A DAY
Qty: 90 CAPSULE | Refills: 3 | Status: ACTIVE | COMMUNITY
Start: 2023-06-30

## 2024-01-03 RX ORDER — MYCOPHENOLIC ACID 360 MG/1
TAKE 1 TABLET BY MOUTH TWICE DAILY TABLET, DELAYED RELEASE ORAL
Qty: 60 EACH | Refills: 2 | Status: ACTIVE | COMMUNITY

## 2024-01-03 RX ORDER — ASPIRIN 81 MG/1
TAKE 1 TABLET BY MOUTH EVERY DAY TABLET, COATED ORAL
Qty: 90 EACH | Refills: 0 | Status: ACTIVE | COMMUNITY

## 2024-01-03 RX ORDER — DOXAZOSIN 4 MG/1
TAKE 1/2 TABLET BY MOUTH TWICE DAILY TABLET ORAL
Qty: 90 EACH | Refills: 0 | Status: ACTIVE | COMMUNITY

## 2024-01-03 RX ORDER — ALLOPURINOL 100 MG/1
TAKE ONE TABLET BY MOUTH ONE TIME DAILY TABLET ORAL
Qty: 90 UNSPECIFIED | Refills: 0 | Status: ACTIVE | COMMUNITY

## 2024-01-03 RX ORDER — TESTOSTERONE CYPIONATE 200 MG/ML
INJECT 1ML IN THE MUSCLE EVERY 2 WEEKS INJECTION, SOLUTION INTRAMUSCULAR
Qty: 2 MILLILITER | Refills: 2 | Status: ACTIVE | COMMUNITY

## 2024-01-03 RX ORDER — TACROLIMUS 0.5 MG/1
TAKE ONE CAPSULE BY MOUTH TWICE DAILY CAPSULE ORAL
Qty: 60 EACH | Refills: 1 | Status: ACTIVE | COMMUNITY

## 2024-01-03 RX ORDER — GLIPIZIDE 5 MG/1
TABLET ORAL
Qty: 90 TABLET | Status: ACTIVE | COMMUNITY

## 2024-01-03 RX ORDER — HYDRALAZINE HYDROCHLORIDE 50 MG/1
TABLET ORAL
Qty: 540 TABLET | Status: ACTIVE | COMMUNITY

## 2024-01-03 RX ORDER — ONDANSETRON HYDROCHLORIDE 4 MG/1
1 TABLET TABLET, FILM COATED ORAL
Qty: 2 | Refills: 0 | Status: ACTIVE | COMMUNITY
Start: 2023-09-13

## 2024-01-03 RX ORDER — MAGNESIUM OXIDE 400 MG/1
TAKE 1 TABLET BY MOUTH TWICE DAILY TABLET ORAL
Qty: 60 EACH | Refills: 0 | Status: ACTIVE | COMMUNITY

## 2024-01-23 ENCOUNTER — TELEPHONE ENCOUNTER (OUTPATIENT)
Dept: URBAN - METROPOLITAN AREA CLINIC 63 | Facility: CLINIC | Age: 84
End: 2024-01-23

## 2024-01-23 ENCOUNTER — OFFICE VISIT (OUTPATIENT)
Dept: URBAN - METROPOLITAN AREA CLINIC 63 | Facility: CLINIC | Age: 84
End: 2024-01-23
Payer: MEDICARE

## 2024-01-23 ENCOUNTER — LAB OUTSIDE AN ENCOUNTER (OUTPATIENT)
Dept: URBAN - METROPOLITAN AREA CLINIC 63 | Facility: CLINIC | Age: 84
End: 2024-01-23

## 2024-01-23 VITALS
HEART RATE: 76 BPM | TEMPERATURE: 97.3 F | OXYGEN SATURATION: 96 % | BODY MASS INDEX: 30.51 KG/M2 | DIASTOLIC BLOOD PRESSURE: 70 MMHG | WEIGHT: 194.4 LBS | HEIGHT: 67 IN | SYSTOLIC BLOOD PRESSURE: 116 MMHG

## 2024-01-23 DIAGNOSIS — Z86.010 PERSONAL HISTORY OF COLONIC POLYPS: ICD-10-CM

## 2024-01-23 DIAGNOSIS — K22.70 BARRETT'S ESOPHAGUS WITHOUT DYSPLASIA: ICD-10-CM

## 2024-01-23 DIAGNOSIS — K74.69 OTHER CIRRHOSIS OF LIVER: ICD-10-CM

## 2024-01-23 DIAGNOSIS — K86.2 PANCREATIC CYST: ICD-10-CM

## 2024-01-23 PROCEDURE — 99214 OFFICE O/P EST MOD 30 MIN: CPT | Performed by: PHYSICIAN ASSISTANT

## 2024-01-23 RX ORDER — HYDRALAZINE HYDROCHLORIDE 50 MG/1
TABLET ORAL
Qty: 540 TABLET | Status: ACTIVE | COMMUNITY

## 2024-01-23 RX ORDER — GLIPIZIDE 5 MG/1
TABLET ORAL
Qty: 90 TABLET | Status: ACTIVE | COMMUNITY

## 2024-01-23 RX ORDER — TORSEMIDE 20 MG/1
AS DIRECTED TABLET ORAL
Status: ACTIVE | COMMUNITY
Start: 2023-06-30

## 2024-01-23 RX ORDER — ALENDRONATE SODIUM 70 MG/1
TABLET ORAL
Qty: 12 TABLET | Status: ACTIVE | COMMUNITY

## 2024-01-23 RX ORDER — MYCOPHENOLIC ACID 360 MG/1
TAKE 1 TABLET BY MOUTH TWICE DAILY TABLET, DELAYED RELEASE ORAL
Qty: 60 EACH | Refills: 2 | Status: ACTIVE | COMMUNITY

## 2024-01-23 RX ORDER — ALLOPURINOL 100 MG/1
TAKE ONE TABLET BY MOUTH ONE TIME DAILY TABLET ORAL
Qty: 90 UNSPECIFIED | Refills: 0 | Status: ACTIVE | COMMUNITY

## 2024-01-23 RX ORDER — MAGNESIUM OXIDE 400 MG/1
TAKE 1 TABLET BY MOUTH TWICE DAILY TABLET ORAL
Qty: 60 EACH | Refills: 0 | Status: ACTIVE | COMMUNITY

## 2024-01-23 RX ORDER — ONDANSETRON HYDROCHLORIDE 4 MG/1
1 TABLET TABLET, FILM COATED ORAL
Qty: 2 | Refills: 0 | Status: ACTIVE | COMMUNITY
Start: 2023-09-13

## 2024-01-23 RX ORDER — PREDNISONE 5 MG/1
TAKE 1 TABLET BY MOUTH EVERY DAY TABLET ORAL
Qty: 30 EACH | Refills: 1 | Status: ACTIVE | COMMUNITY

## 2024-01-23 RX ORDER — TESTOSTERONE CYPIONATE 200 MG/ML
INJECT 1ML IN THE MUSCLE EVERY 2 WEEKS INJECTION, SOLUTION INTRAMUSCULAR
Qty: 2 MILLILITER | Refills: 2 | Status: ACTIVE | COMMUNITY

## 2024-01-23 RX ORDER — OMEPRAZOLE 20 MG/1
1 CAPSULE 30 MINUTES BEFORE MORNING MEAL CAPSULE, DELAYED RELEASE ORAL ONCE A DAY
Qty: 90 CAPSULE | Refills: 3 | Status: ACTIVE | COMMUNITY
Start: 2023-06-30

## 2024-01-23 RX ORDER — POTASSIUM CHLORIDE 10 MEQ/1
CAPSULE, COATED, EXTENDED RELEASE ORAL
Qty: 90 CAPSULE | Status: ACTIVE | COMMUNITY

## 2024-01-23 RX ORDER — AMLODIPINE BESYLATE 10 MG/1
TAKE ONE TABLET BY MOUTH ONE TIME DAILY TABLET ORAL
Qty: 30 UNSPECIFIED | Refills: 1 | Status: ACTIVE | COMMUNITY

## 2024-01-23 RX ORDER — TACROLIMUS 0.5 MG/1
TAKE ONE CAPSULE BY MOUTH TWICE DAILY CAPSULE ORAL
Qty: 60 EACH | Refills: 1 | Status: ACTIVE | COMMUNITY

## 2024-01-23 RX ORDER — DOXAZOSIN 4 MG/1
TAKE 1/2 TABLET BY MOUTH TWICE DAILY TABLET ORAL
Qty: 90 EACH | Refills: 0 | Status: ACTIVE | COMMUNITY

## 2024-01-23 RX ORDER — CALCIUM CARBONATE/VITAMIN D3 500MG-5MCG
TAKE 1 TABLET BY MOUTH EVERY DAY TABLET ORAL
Qty: 90 EACH | Refills: 1 | Status: ACTIVE | COMMUNITY

## 2024-01-23 RX ORDER — ASPIRIN 81 MG/1
TAKE 1 TABLET BY MOUTH EVERY DAY TABLET, COATED ORAL
Qty: 90 EACH | Refills: 0 | Status: ACTIVE | COMMUNITY

## 2024-01-23 NOTE — HPI-TODAY'S VISIT:
Roddy is a pleasant 83-year-old male who presents today for a procedure follow up.  Colonoscopy dated 1/3/2024 showed multiple colon polyps.  Suboptimal prep.  Pandiverticulosis coli.  Mild internal hemorrhoids.  Distal sigmoid colon polypectomy showed polypoid fragments of colonic mucosa suggestive of a hyperplastic polyp.  Hepatic flexure polypectomy showed tubular adenoma.  Cecal polypectomy showed fragments of polypoid colonic mucosa with underlying lymphoid aggregates.  Mid descending polypectomy consistent with tubular adenoma.  Sigmoid colon polyp x 2 consistent with tubular adenoma.  Distal sigmoid colon polyp consistent with tubular adenoma.  Labs dated 12/20/2023 showed normal hemoglobin.  Platelets 129.  PT 16.1, INR 1.33.  Creatinine 1.36, GFR 52.6.  Normal LFTs.  TSH normal.  Vitamin B12 normal.  aFP normal.  Ferritin normal.  Iron saturation 18%.  Normal total iron.  Patient was evaluated by surgical oncology for history of pancreatic cyst.  Plan is for surveillance.  Plan to repeat MRI 1 year from prior study  MRI abdomen with and without contrast dated 8/7/2023 showed no appreciable interval growth of the lobulated fluid signal lesion in the body of the pancreas estimated at 3.2 x 1.6 x 1.1 cm suspicious for sidebranch IPMN.  No solid enhancing components or pancreatic ductal dilatation.  Continued surveillance is recommended with pancreas protocol MRI abdomen with and without contrast in 6 months  MRI abdomen/MRCP with and without contrast dated 5/22/2023 showed cirrhosis without suspicious liver mass.  Recommend routine imaging surveillance for HCC in 6 months.  Branching 3.2 cm cystic lesion in the body of the pancreas is most consistent with a sidebranch IPMN.  Size greater than 3 cm is a worrisome feature.  No high risk stigmata.  Recommend further evaluation with a EUS versus short interval follow-up MRI pancreas.  Mild sequela of portal hypertension include mild splenomegaly and small volume scattered free fluid.  Patent portal vein  Colonoscopy dated 6/9/23 showed two diminutive polyps removed from the cecum. 6 mm polyp removed from the cecum. One clip was placed. 7 mm polyp removed from the cecum. Clip was placed. Four diminutive polyps removed from the mid descending colon. 8 mm polyp removed from the distal descending colon in piecemeal fashion. Two 7-8 mm polyps removed from the distal descending colon. Two 6-8 mm polyps removed from the mid sigmoid colon. Diminutive polyp removed from the mid sigmoid colon. Diverticulosis in the sigmoid colon and non bleeding internal hemorrhoids present upon retroflexion. Repeat colonoscopy in 6 months. All polyps consistent with tubular adenomas  EGD/EUS dated 6/14/2023.  Anechoic lesion in the body of the pancreas consistent with pancreatic cyst without any worrisome features septated in nature measuring approximately 2.5 to 2.6 x 1.5 to 1.6 cm status post FNA of approximately 1 mL of clear, slightly blood-tinged fluid but could not definitively appreciate PD connection.  Minimal inflammation at the GE junction.  Mild gastritis.  Will refer to Dr. Nelson for further evaluation.  CEA in the fluid 167.2.  Gastric antral and fundic type mucosa with reactive gastropathy mild chronic gastritis.  Negative for H. pylori.  GE junction biopsies with mild acute chronic gastritis and focal intestinal metaplasia.  No dysplasia or malignancy seen  No issues after procedure. Myriad genetic testing pending. Notes 1-2 daily bowel movements without constipation or diarrhea. Denies nausea, vomiting, heartburn, reflux, dysphagia, odynophagia, hematemesis, coffee ground emesis, abdominal pain, change in bowel habits, abnormal weight loss, BRBPR or melena. Denies family history of colon cancer or colon polyps. No other GI complaints at this time. Denies EtOH use. MELD Na score of 13

## 2024-03-13 ENCOUNTER — OV EP (OUTPATIENT)
Dept: URBAN - METROPOLITAN AREA CLINIC 63 | Facility: CLINIC | Age: 84
End: 2024-03-13

## 2024-03-14 ENCOUNTER — LAB (OUTPATIENT)
Dept: URBAN - METROPOLITAN AREA CLINIC 63 | Facility: CLINIC | Age: 84
End: 2024-03-14

## 2024-03-14 ENCOUNTER — OV EP (OUTPATIENT)
Dept: URBAN - METROPOLITAN AREA CLINIC 63 | Facility: CLINIC | Age: 84
End: 2024-03-14
Payer: MEDICARE

## 2024-03-14 VITALS
HEIGHT: 67 IN | OXYGEN SATURATION: 95 % | SYSTOLIC BLOOD PRESSURE: 120 MMHG | DIASTOLIC BLOOD PRESSURE: 80 MMHG | TEMPERATURE: 97.3 F | WEIGHT: 195 LBS | BODY MASS INDEX: 30.61 KG/M2 | HEART RATE: 79 BPM

## 2024-03-14 DIAGNOSIS — Z86.010 PERSONAL HISTORY OF COLONIC POLYPS: ICD-10-CM

## 2024-03-14 DIAGNOSIS — K74.69 OTHER CIRRHOSIS OF LIVER: ICD-10-CM

## 2024-03-14 DIAGNOSIS — K86.2 PANCREATIC CYST: ICD-10-CM

## 2024-03-14 DIAGNOSIS — K22.70 BARRETT'S ESOPHAGUS WITHOUT DYSPLASIA: ICD-10-CM

## 2024-03-14 PROCEDURE — 99214 OFFICE O/P EST MOD 30 MIN: CPT | Performed by: PHYSICIAN ASSISTANT

## 2024-03-14 RX ORDER — PREDNISONE 5 MG/1
TAKE 1 TABLET BY MOUTH EVERY DAY TABLET ORAL
Qty: 30 EACH | Refills: 1 | Status: ACTIVE | COMMUNITY

## 2024-03-14 RX ORDER — OMEPRAZOLE 20 MG/1
1 CAPSULE 30 MINUTES BEFORE MORNING MEAL CAPSULE, DELAYED RELEASE ORAL ONCE A DAY
Qty: 90 CAPSULE | Refills: 3 | Status: ACTIVE | COMMUNITY
Start: 2023-06-30

## 2024-03-14 RX ORDER — MYCOPHENOLIC ACID 360 MG/1
TAKE 1 TABLET BY MOUTH TWICE DAILY TABLET, DELAYED RELEASE ORAL
Qty: 60 EACH | Refills: 2 | Status: ACTIVE | COMMUNITY

## 2024-03-14 RX ORDER — TACROLIMUS 0.5 MG/1
TAKE ONE CAPSULE BY MOUTH TWICE DAILY CAPSULE ORAL
Qty: 60 EACH | Refills: 1 | Status: ACTIVE | COMMUNITY

## 2024-03-14 RX ORDER — ONDANSETRON HYDROCHLORIDE 4 MG/1
1 TABLET TABLET, FILM COATED ORAL
Qty: 2 | Refills: 0 | Status: ACTIVE | COMMUNITY
Start: 2023-09-13

## 2024-03-14 RX ORDER — ALLOPURINOL 100 MG/1
TAKE ONE TABLET BY MOUTH ONE TIME DAILY TABLET ORAL
Qty: 90 UNSPECIFIED | Refills: 0 | Status: ACTIVE | COMMUNITY

## 2024-03-14 RX ORDER — GLIPIZIDE 5 MG/1
TABLET ORAL
Qty: 90 TABLET | Status: ACTIVE | COMMUNITY

## 2024-03-14 RX ORDER — POTASSIUM CHLORIDE 10 MEQ/1
CAPSULE, COATED, EXTENDED RELEASE ORAL
Qty: 90 CAPSULE | Status: ACTIVE | COMMUNITY

## 2024-03-14 RX ORDER — HYDRALAZINE HYDROCHLORIDE 50 MG/1
TABLET ORAL
Qty: 540 TABLET | Status: ACTIVE | COMMUNITY

## 2024-03-14 RX ORDER — CALCIUM CARBONATE/VITAMIN D3 500MG-5MCG
TAKE 1 TABLET BY MOUTH EVERY DAY TABLET ORAL
Qty: 90 EACH | Refills: 1 | Status: ACTIVE | COMMUNITY

## 2024-03-14 RX ORDER — AMLODIPINE BESYLATE 10 MG/1
TAKE ONE TABLET BY MOUTH ONE TIME DAILY TABLET ORAL
Qty: 30 UNSPECIFIED | Refills: 1 | Status: ACTIVE | COMMUNITY

## 2024-03-14 RX ORDER — TESTOSTERONE CYPIONATE 200 MG/ML
INJECT 1ML IN THE MUSCLE EVERY 2 WEEKS INJECTION, SOLUTION INTRAMUSCULAR
Qty: 2 MILLILITER | Refills: 2 | Status: ACTIVE | COMMUNITY

## 2024-03-14 RX ORDER — ALENDRONATE SODIUM 70 MG/1
TABLET ORAL
Qty: 12 TABLET | Status: ACTIVE | COMMUNITY

## 2024-03-14 RX ORDER — DOXAZOSIN 4 MG/1
TAKE 1/2 TABLET BY MOUTH TWICE DAILY TABLET ORAL
Qty: 90 EACH | Refills: 0 | Status: ACTIVE | COMMUNITY

## 2024-03-14 RX ORDER — ASPIRIN 81 MG/1
TAKE 1 TABLET BY MOUTH EVERY DAY TABLET, COATED ORAL
Qty: 90 EACH | Refills: 0 | Status: ACTIVE | COMMUNITY

## 2024-03-14 RX ORDER — MAGNESIUM OXIDE 400 MG/1
TAKE 1 TABLET BY MOUTH TWICE DAILY TABLET ORAL
Qty: 60 EACH | Refills: 0 | Status: ACTIVE | COMMUNITY

## 2024-03-14 RX ORDER — TORSEMIDE 20 MG/1
AS DIRECTED TABLET ORAL
Status: ACTIVE | COMMUNITY
Start: 2023-06-30

## 2024-05-13 ENCOUNTER — OFFICE VISIT (OUTPATIENT)
Dept: URBAN - METROPOLITAN AREA MEDICAL CENTER 14 | Facility: MEDICAL CENTER | Age: 84
End: 2024-05-13

## 2024-05-13 RX ORDER — OMEPRAZOLE 20 MG/1
1 CAPSULE 30 MINUTES BEFORE MORNING MEAL CAPSULE, DELAYED RELEASE ORAL ONCE A DAY
Qty: 90 CAPSULE | Refills: 3 | Status: ACTIVE | COMMUNITY
Start: 2023-06-30

## 2024-05-13 RX ORDER — MAGNESIUM OXIDE 400 MG/1
TAKE 1 TABLET BY MOUTH TWICE DAILY TABLET ORAL
Qty: 60 EACH | Refills: 0 | Status: ACTIVE | COMMUNITY

## 2024-05-13 RX ORDER — ALLOPURINOL 100 MG/1
TAKE ONE TABLET BY MOUTH ONE TIME DAILY TABLET ORAL
Qty: 90 UNSPECIFIED | Refills: 0 | Status: ACTIVE | COMMUNITY

## 2024-05-13 RX ORDER — AMLODIPINE BESYLATE 10 MG/1
TAKE ONE TABLET BY MOUTH ONE TIME DAILY TABLET ORAL
Qty: 30 UNSPECIFIED | Refills: 1 | Status: ACTIVE | COMMUNITY

## 2024-05-13 RX ORDER — TESTOSTERONE CYPIONATE 200 MG/ML
INJECT 1ML IN THE MUSCLE EVERY 2 WEEKS INJECTION, SOLUTION INTRAMUSCULAR
Qty: 2 MILLILITER | Refills: 2 | Status: ACTIVE | COMMUNITY

## 2024-05-13 RX ORDER — DOXAZOSIN 4 MG/1
TAKE 1/2 TABLET BY MOUTH TWICE DAILY TABLET ORAL
Qty: 90 EACH | Refills: 0 | Status: ACTIVE | COMMUNITY

## 2024-05-13 RX ORDER — MYCOPHENOLIC ACID 360 MG/1
TAKE 1 TABLET BY MOUTH TWICE DAILY TABLET, DELAYED RELEASE ORAL
Qty: 60 EACH | Refills: 2 | Status: ACTIVE | COMMUNITY

## 2024-05-13 RX ORDER — PREDNISONE 5 MG/1
TAKE 1 TABLET BY MOUTH EVERY DAY TABLET ORAL
Qty: 30 EACH | Refills: 1 | Status: ACTIVE | COMMUNITY

## 2024-05-13 RX ORDER — ASPIRIN 81 MG/1
TAKE 1 TABLET BY MOUTH EVERY DAY TABLET, COATED ORAL
Qty: 90 EACH | Refills: 0 | Status: ACTIVE | COMMUNITY

## 2024-05-13 RX ORDER — ALENDRONATE SODIUM 70 MG/1
TABLET ORAL
Qty: 12 TABLET | Status: ACTIVE | COMMUNITY

## 2024-05-13 RX ORDER — TACROLIMUS 0.5 MG/1
TAKE ONE CAPSULE BY MOUTH TWICE DAILY CAPSULE ORAL
Qty: 60 EACH | Refills: 1 | Status: ACTIVE | COMMUNITY

## 2024-05-13 RX ORDER — POTASSIUM CHLORIDE 10 MEQ/1
CAPSULE, COATED, EXTENDED RELEASE ORAL
Qty: 90 CAPSULE | Status: ACTIVE | COMMUNITY

## 2024-05-13 RX ORDER — HYDRALAZINE HYDROCHLORIDE 50 MG/1
TABLET ORAL
Qty: 540 TABLET | Status: ACTIVE | COMMUNITY

## 2024-05-13 RX ORDER — ONDANSETRON HYDROCHLORIDE 4 MG/1
1 TABLET TABLET, FILM COATED ORAL
Qty: 2 | Refills: 0 | Status: ACTIVE | COMMUNITY
Start: 2023-09-13

## 2024-05-13 RX ORDER — TORSEMIDE 20 MG/1
AS DIRECTED TABLET ORAL
Status: ACTIVE | COMMUNITY
Start: 2023-06-30

## 2024-05-13 RX ORDER — CALCIUM CARBONATE/VITAMIN D3 500MG-5MCG
TAKE 1 TABLET BY MOUTH EVERY DAY TABLET ORAL
Qty: 90 EACH | Refills: 1 | Status: ACTIVE | COMMUNITY

## 2024-05-13 RX ORDER — GLIPIZIDE 5 MG/1
TABLET ORAL
Qty: 90 TABLET | Status: ACTIVE | COMMUNITY

## 2024-05-31 ENCOUNTER — DASHBOARD ENCOUNTERS (OUTPATIENT)
Age: 84
End: 2024-05-31

## 2024-06-13 ENCOUNTER — OFFICE VISIT (OUTPATIENT)
Dept: URBAN - METROPOLITAN AREA CLINIC 63 | Facility: CLINIC | Age: 84
End: 2024-06-13

## 2024-06-13 RX ORDER — ALENDRONATE SODIUM 70 MG/1
TABLET ORAL
Qty: 12 TABLET | COMMUNITY

## 2024-06-13 RX ORDER — TESTOSTERONE CYPIONATE 200 MG/ML
INJECT 1ML IN THE MUSCLE EVERY 2 WEEKS INJECTION, SOLUTION INTRAMUSCULAR
Qty: 2 MILLILITER | Refills: 2 | COMMUNITY

## 2024-06-13 RX ORDER — ALLOPURINOL 100 MG/1
TAKE ONE TABLET BY MOUTH ONE TIME DAILY TABLET ORAL
Qty: 90 UNSPECIFIED | Refills: 0 | COMMUNITY

## 2024-06-13 RX ORDER — POTASSIUM CHLORIDE 10 MEQ/1
CAPSULE, COATED, EXTENDED RELEASE ORAL
Qty: 90 CAPSULE | COMMUNITY

## 2024-06-13 RX ORDER — HYDRALAZINE HYDROCHLORIDE 50 MG/1
TABLET ORAL
Qty: 540 TABLET | COMMUNITY

## 2024-06-13 RX ORDER — PREDNISONE 5 MG/1
TAKE 1 TABLET BY MOUTH EVERY DAY TABLET ORAL
Qty: 30 EACH | Refills: 1 | COMMUNITY

## 2024-06-13 RX ORDER — OMEPRAZOLE 20 MG/1
1 CAPSULE 30 MINUTES BEFORE MORNING MEAL CAPSULE, DELAYED RELEASE ORAL ONCE A DAY
Qty: 90 CAPSULE | Refills: 3 | COMMUNITY
Start: 2023-06-30

## 2024-06-13 RX ORDER — TACROLIMUS 0.5 MG/1
TAKE ONE CAPSULE BY MOUTH TWICE DAILY CAPSULE ORAL
Qty: 60 EACH | Refills: 1 | COMMUNITY

## 2024-06-13 RX ORDER — MYCOPHENOLIC ACID 360 MG/1
TAKE 1 TABLET BY MOUTH TWICE DAILY TABLET, DELAYED RELEASE ORAL
Qty: 60 EACH | Refills: 2 | COMMUNITY

## 2024-06-13 RX ORDER — AMLODIPINE BESYLATE 10 MG/1
TAKE ONE TABLET BY MOUTH ONE TIME DAILY TABLET ORAL
Qty: 30 UNSPECIFIED | Refills: 1 | COMMUNITY

## 2024-06-13 RX ORDER — CALCIUM CARBONATE/VITAMIN D3 500MG-5MCG
TAKE 1 TABLET BY MOUTH EVERY DAY TABLET ORAL
Qty: 90 EACH | Refills: 1 | COMMUNITY

## 2024-06-13 RX ORDER — ASPIRIN 81 MG/1
TAKE 1 TABLET BY MOUTH EVERY DAY TABLET, COATED ORAL
Qty: 90 EACH | Refills: 0 | COMMUNITY

## 2024-06-13 RX ORDER — TORSEMIDE 20 MG/1
AS DIRECTED TABLET ORAL
COMMUNITY
Start: 2023-06-30

## 2024-06-13 RX ORDER — GLIPIZIDE 5 MG/1
TABLET ORAL
Qty: 90 TABLET | COMMUNITY

## 2024-06-13 RX ORDER — DOXAZOSIN 4 MG/1
TAKE 1/2 TABLET BY MOUTH TWICE DAILY TABLET ORAL
Qty: 90 EACH | Refills: 0 | COMMUNITY

## 2024-06-13 RX ORDER — MAGNESIUM OXIDE 400 MG/1
TAKE 1 TABLET BY MOUTH TWICE DAILY TABLET ORAL
Qty: 60 EACH | Refills: 0 | COMMUNITY

## 2024-06-13 NOTE — PHYSICAL EXAM CARDIOVASCULAR:
no edema, no murmurs, regular rate and rhythm
Urine Pregnancy Test Result: negative
Detail Level: None
Performed By: Maribell mae

## 2024-06-13 NOTE — HPI-TODAY'S VISIT:
Roddy is a pleasant 83-year-old male who presents today for a follow up.  Myriad genetic testing pending.  Currently on omeprazole 20 mg once daily.  Previous MELD score 12.  Labs ordered last visit not completed.  MRI due in August 2024.  Following with surgical oncology for surveillance of pancreatic cyst    MRI abdomen/MRCP with and without contrast dated 2/9/2024 showed cirrhosis without suspicious mass. Recommend continued routine imaging surveillance for HCC. Mild sequela of portal hypertension. Stable pancreatic cystic lesions, the largest measuring 3.3 cm in the body most consistent with sidebranch IPMN. No other worrisome features or high risk stigmata. Recommend continued surveillance/follow-up with endoscopic ultrasound and/or MRI    Labs dated 1/26/2024 showed a normal hemoglobin. Platelets 147. PT 15.1, INR 1.21. Creatinine 1.49, GFR 46.9. Normal LFTs. aFP normal.    Colonoscopy dated 1/3/2024 showed multiple colon polyps. Suboptimal prep. Pandiverticulosis coli. Mild internal hemorrhoids. Distal sigmoid colon polypectomy showed polypoid fragments of colonic mucosa suggestive of a hyperplastic polyp. Hepatic flexure polypectomy showed tubular adenoma. Cecal polypectomy showed fragments of polypoid colonic mucosa with underlying lymphoid aggregates. Mid descending polypectomy consistent with tubular adenoma. Sigmoid colon polyp x 2 consistent with tubular adenoma. Distal sigmoid colon polyp consistent with tubular adenoma.  Patient was evaluated by surgical oncology for history of pancreatic cyst. Plan is for surveillance. Plan to repeat MRI 1 year from prior study  Colonoscopy dated 6/9/23 showed two diminutive polyps removed from the cecum. 6 mm polyp removed from the cecum. One clip was placed. 7 mm polyp removed from the cecum. Clip was placed. Four diminutive polyps removed from the mid descending colon. 8 mm polyp removed from the distal descending colon in piecemeal fashion. Two 7-8 mm polyps removed from the distal descending colon. Two 6-8 mm polyps removed from the mid sigmoid colon. Diminutive polyp removed from the mid sigmoid colon. Diverticulosis in the sigmoid colon and non bleeding internal hemorrhoids present upon retroflexion. Repeat colonoscopy in 6 months. All polyps consistent with tubular adenomas  EGD/EUS dated 6/14/2023. Anechoic lesion in the body of the pancreas consistent with pancreatic cyst without any worrisome features septated in nature measuring approximately 2.5 to 2.6 x 1.5 to 1.6 cm status post FNA of approximately 1 mL of clear, slightly blood-tinged fluid but could not definitively appreciate PD connection. Minimal inflammation at the GE junction. Mild gastritis. Will refer to Dr. Nelson for further evaluation. CEA in the fluid 167.2. Gastric antral and fundic type mucosa with reactive gastropathy mild chronic gastritis. Negative for H. pylori. GE junction biopsies with mild acute chronic gastritis and focal intestinal metaplasia. 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

## 2024-06-21 ENCOUNTER — LAB OUTSIDE AN ENCOUNTER (OUTPATIENT)
Dept: URBAN - METROPOLITAN AREA CLINIC 63 | Facility: CLINIC | Age: 84
End: 2024-06-21

## 2024-06-21 ENCOUNTER — OFFICE VISIT (OUTPATIENT)
Dept: URBAN - METROPOLITAN AREA CLINIC 63 | Facility: CLINIC | Age: 84
End: 2024-06-21

## 2024-06-21 VITALS
HEART RATE: 78 BPM | OXYGEN SATURATION: 99 % | DIASTOLIC BLOOD PRESSURE: 70 MMHG | BODY MASS INDEX: 30.95 KG/M2 | TEMPERATURE: 97.8 F | HEIGHT: 67 IN | WEIGHT: 197.2 LBS | SYSTOLIC BLOOD PRESSURE: 120 MMHG

## 2024-06-21 RX ORDER — TORSEMIDE 20 MG/1
AS DIRECTED TABLET ORAL
Status: ACTIVE | COMMUNITY
Start: 2023-06-30

## 2024-06-21 RX ORDER — PREDNISONE 5 MG/1
TAKE 1 TABLET BY MOUTH EVERY DAY TABLET ORAL
Qty: 30 EACH | Refills: 1 | Status: ACTIVE | COMMUNITY

## 2024-06-21 RX ORDER — ASPIRIN 81 MG/1
TAKE 1 TABLET BY MOUTH EVERY DAY TABLET, COATED ORAL
Qty: 90 EACH | Refills: 0 | Status: ACTIVE | COMMUNITY

## 2024-06-21 RX ORDER — TESTOSTERONE CYPIONATE 200 MG/ML
INJECT 1ML IN THE MUSCLE EVERY 2 WEEKS INJECTION, SOLUTION INTRAMUSCULAR
Qty: 2 MILLILITER | Refills: 2 | Status: ACTIVE | COMMUNITY

## 2024-06-21 RX ORDER — GLIPIZIDE 5 MG/1
TABLET ORAL
Qty: 90 TABLET | Status: ACTIVE | COMMUNITY

## 2024-06-21 RX ORDER — MAGNESIUM OXIDE 400 MG/1
TAKE 1 TABLET BY MOUTH TWICE DAILY TABLET ORAL
Qty: 60 EACH | Refills: 0 | Status: ACTIVE | COMMUNITY

## 2024-06-21 RX ORDER — MYCOPHENOLIC ACID 360 MG/1
TAKE 1 TABLET BY MOUTH TWICE DAILY TABLET, DELAYED RELEASE ORAL
Qty: 60 EACH | Refills: 2 | Status: ACTIVE | COMMUNITY

## 2024-06-21 RX ORDER — POTASSIUM CHLORIDE 10 MEQ/1
CAPSULE, COATED, EXTENDED RELEASE ORAL
Qty: 90 CAPSULE | Status: ACTIVE | COMMUNITY

## 2024-06-21 RX ORDER — ALLOPURINOL 100 MG/1
TAKE ONE TABLET BY MOUTH ONE TIME DAILY TABLET ORAL
Qty: 90 UNSPECIFIED | Refills: 0 | Status: ACTIVE | COMMUNITY

## 2024-06-21 RX ORDER — CALCIUM CARBONATE/VITAMIN D3 500MG-5MCG
TAKE 1 TABLET BY MOUTH EVERY DAY TABLET ORAL
Qty: 90 EACH | Refills: 1 | Status: ACTIVE | COMMUNITY

## 2024-06-21 RX ORDER — DOXAZOSIN 4 MG/1
TAKE 1/2 TABLET BY MOUTH TWICE DAILY TABLET ORAL
Qty: 90 EACH | Refills: 0 | Status: ACTIVE | COMMUNITY

## 2024-06-21 RX ORDER — TACROLIMUS 0.5 MG/1
TAKE ONE CAPSULE BY MOUTH TWICE DAILY CAPSULE ORAL
Qty: 60 EACH | Refills: 1 | Status: ACTIVE | COMMUNITY

## 2024-06-21 RX ORDER — OMEPRAZOLE 20 MG/1
1 CAPSULE 30 MINUTES BEFORE MORNING MEAL CAPSULE, DELAYED RELEASE ORAL ONCE A DAY
Qty: 90 CAPSULE | Refills: 3 | Status: ACTIVE | COMMUNITY
Start: 2023-06-30

## 2024-06-21 NOTE — HPI-PREVIOUS LABS
Labs dated 1/26/2024 showed a normal hemoglobin. Platelets 147. PT 15.1, INR 1.21. Creatinine 1.49, GFR 46.9. Normal LFTs. aFP normal.

## 2024-06-21 NOTE — HPI-PREVIOUS PROCEDURES
Endoscopy 5/13/2024 consistent with no endoscopic evidence of Bingham's esophagus, no variceal disease, GE junction polyp, small hiatal hernia, gastritis, LA grade a esophagitis Recall for 3 years Gastric biopsy consistent with body and antral type mucosa with no evidence of H. pylori GE junction biopsy consistent with Bingham's esophagus negative for dysplasia in the background of chronic active carditis GE junction biopsy consistent with gastro esophageal junction negative for intestinal metaplasia  Colonoscopy dated 6/9/23 showed two diminutive polyps removed from the cecum. 6 mm polyp removed from the cecum. One clip was placed. 7 mm polyp removed from the cecum. Clip was placed. Four diminutive polyps removed from the mid descending colon. 8 mm polyp removed from the distal descending colon in piecemeal fashion. Two 7-8 mm polyps removed from the distal descending colon. Two 6-8 mm polyps removed from the mid sigmoid colon. Diminutive polyp removed from the mid sigmoid colon. Diverticulosis in the sigmoid colon and non bleeding internal hemorrhoids present upon retroflexion. Repeat colonoscopy in 6 months. All polyps consistent with tubular adenomas  EGD/EUS dated 6/14/2023. Anechoic lesion in the body of the pancreas consistent with pancreatic cyst without any worrisome features septated in nature measuring approximately 2.5 to 2.6 x 1.5 to 1.6 cm status post FNA of approximately 1 mL of clear, slightly blood-tinged fluid but could not definitively appreciate PD connection. Minimal inflammation at the GE junction. Mild gastritis. Will refer to Dr. Nelson for further evaluation. CEA in the fluid 167.2. Gastric antral and fundic type mucosa with reactive gastropathy mild chronic gastritis. Negative for H. pylori. GE junction biopsies with mild acute chronic gastritis and focal intestinal metaplasia. No dysplasia or malignancy seen  Colonoscopy dated 1/3/2024 showed multiple colon polyps. Suboptimal prep. Pandiverticulosis coli. Mild internal hemorrhoids. Distal sigmoid colon polypectomy showed polypoid fragments of colonic mucosa suggestive of a hyperplastic polyp. Hepatic flexure polypectomy showed tubular adenoma. Cecal polypectomy showed fragments of polypoid colonic mucosa with underlying lymphoid aggregates. Mid descending polypectomy consistent with tubular adenoma. Sigmoid colon polyp x 2 consistent with tubular adenoma. Distal sigmoid colon polyp consistent with tubular adenoma.  Patient was evaluated by surgical oncology for history of pancreatic cyst. Plan is for surveillance. Plan to repeat MRI 1 year from prior study

## 2024-06-21 NOTE — HPI-PREVIOUS IMAGING
2/9/2024 MR abdomen and MRCP pancreas with and without contrast consistent with cirrhosis without suspicious mass.  Recommend continued routine imaging surveillance for hepatocellular carcinoma Mild sequelae of portal hypertension Stable pancreatic cyst lesion, largest measuring 3.3 in body most consistent with sidebranch IPMN.  No new worrisome features or high risk stigmata recommend surveillance and follow-up with EUS and/or MRI

## 2024-06-21 NOTE — HPI-ZZZTODAY'S VISIT
Patient is a very pleasant 83-year-old male who presents for follow-up.  He is a patient of Dr. Russ.  Last seen in office by KULWANT Doyle on 3/14/2024.  Past medical history significant for testicular hypofunction, diabetes, neuropathy, B12 deficiency, osteoporosis, cirrhosis, Bingham's esophagus without dysplasia, GERD atrial fibrillation (amlodipine, no blood thinner), nephrolithiasis, skin cancer, renal transplant, stable chronic pancreatic cystic lesions, portal hypertension, OA, osteopenia, MIKE, CKD, hyperlipidemia, HTN.  Past surgical history significant for kidney transplant, knee replacement, AV fistula, cataracts.  Last colonoscopy 1/3/2024.  Last endoscopy in 5/13/2024.  Family history significant for paternal uncle with jaw cancer at 39, paternal aunt with uterine cancer at 50, paternal cousin with intestinal cancer at 80, maternal breast cancer 56, sister breast cancer at 26 years old.  Patient has pancreatic cyst evaluated by Dr. Ochoa with surgical oncology.  Plan for surveillance with MRI 1 year from previous imaging. Patient had many polyps on previous colonoscopies, for this reason myriad testing was ordered. Patient had many polyps on previous colonoscopies, for this reason myriad testing was ordered. This was denied by insurance  Roddy presents for follow up. He is asymptomatic from a GI perspecitve. Patient denies jaundice, pruritus, change in mentation, increased confusion, abdominal distention, recent weight gain, or sleep disturbances. He denies dysphagia, dyspepsia, pyrosis, abdominal pain, change in bowel habits, unintentional weight loss, melena, or hematochezia.

## 2024-07-18 ENCOUNTER — OFFICE VISIT (OUTPATIENT)
Dept: URBAN - METROPOLITAN AREA CLINIC 63 | Facility: CLINIC | Age: 84
End: 2024-07-18

## 2024-12-21 ENCOUNTER — LAB OUTSIDE AN ENCOUNTER (OUTPATIENT)
Dept: URBAN - METROPOLITAN AREA CLINIC 63 | Facility: CLINIC | Age: 84
End: 2024-12-21

## 2025-03-05 ENCOUNTER — P2P PATIENT RECORD (OUTPATIENT)
Age: 85
End: 2025-03-05

## 2025-03-13 ENCOUNTER — TELEPHONE ENCOUNTER (OUTPATIENT)
Dept: URBAN - METROPOLITAN AREA CLINIC 64 | Facility: CLINIC | Age: 85
End: 2025-03-13

## 2025-03-18 ENCOUNTER — LAB OUTSIDE AN ENCOUNTER (OUTPATIENT)
Dept: URBAN - METROPOLITAN AREA CLINIC 63 | Facility: CLINIC | Age: 85
End: 2025-03-18

## 2025-03-26 ENCOUNTER — OFFICE VISIT (OUTPATIENT)
Dept: URBAN - METROPOLITAN AREA CLINIC 63 | Facility: CLINIC | Age: 85
End: 2025-03-26
Payer: MEDICARE

## 2025-03-26 DIAGNOSIS — K74.69 OTHER CIRRHOSIS OF LIVER: ICD-10-CM

## 2025-03-26 DIAGNOSIS — K86.2 PANCREATIC CYST: ICD-10-CM

## 2025-03-26 PROCEDURE — 99214 OFFICE O/P EST MOD 30 MIN: CPT | Performed by: INTERNAL MEDICINE

## 2025-03-26 RX ORDER — TESTOSTERONE CYPIONATE 200 MG/ML
INJECT 1ML IN THE MUSCLE EVERY 2 WEEKS INJECTION, SOLUTION INTRAMUSCULAR
Qty: 2 MILLILITER | Refills: 2 | Status: ACTIVE | COMMUNITY

## 2025-03-26 RX ORDER — ALLOPURINOL 100 MG/1
TAKE ONE TABLET BY MOUTH ONE TIME DAILY TABLET ORAL
Qty: 90 UNSPECIFIED | Refills: 0 | Status: ACTIVE | COMMUNITY

## 2025-03-26 RX ORDER — POTASSIUM CHLORIDE 10 MEQ/1
CAPSULE, COATED, EXTENDED RELEASE ORAL
Qty: 90 CAPSULE | Status: ACTIVE | COMMUNITY

## 2025-03-26 RX ORDER — MAGNESIUM OXIDE 400 MG/1
TAKE 1 TABLET BY MOUTH TWICE DAILY TABLET ORAL
Qty: 60 EACH | Refills: 0 | Status: ACTIVE | COMMUNITY

## 2025-03-26 RX ORDER — GLIPIZIDE 5 MG/1
TABLET ORAL
Qty: 90 TABLET | Status: ACTIVE | COMMUNITY

## 2025-03-26 RX ORDER — CALCIUM CARBONATE/VITAMIN D3 500MG-5MCG
TAKE 1 TABLET BY MOUTH EVERY DAY TABLET ORAL
Qty: 90 EACH | Refills: 1 | Status: ACTIVE | COMMUNITY

## 2025-03-26 RX ORDER — DOXAZOSIN 4 MG/1
TAKE 1/2 TABLET BY MOUTH TWICE DAILY TABLET ORAL
Qty: 90 EACH | Refills: 0 | Status: ACTIVE | COMMUNITY

## 2025-03-26 RX ORDER — MYCOPHENOLIC ACID 360 MG/1
TAKE 1 TABLET BY MOUTH TWICE DAILY TABLET, DELAYED RELEASE ORAL
Qty: 60 EACH | Refills: 2 | Status: ACTIVE | COMMUNITY

## 2025-03-26 RX ORDER — TORSEMIDE 20 MG/1
AS DIRECTED TABLET ORAL
Status: ACTIVE | COMMUNITY
Start: 2023-06-30

## 2025-03-26 RX ORDER — ASPIRIN 81 MG/1
TAKE 1 TABLET BY MOUTH EVERY DAY TABLET, COATED ORAL
Qty: 90 EACH | Refills: 0 | Status: ACTIVE | COMMUNITY

## 2025-03-26 RX ORDER — TACROLIMUS 0.5 MG/1
TAKE ONE CAPSULE BY MOUTH TWICE DAILY CAPSULE ORAL
Qty: 60 EACH | Refills: 1 | Status: ACTIVE | COMMUNITY

## 2025-03-26 RX ORDER — PREDNISONE 5 MG/1
TAKE 1 TABLET BY MOUTH EVERY DAY TABLET ORAL
Qty: 30 EACH | Refills: 1 | Status: ACTIVE | COMMUNITY

## 2025-03-26 RX ORDER — OMEPRAZOLE 20 MG/1
1 CAPSULE 30 MINUTES BEFORE MORNING MEAL CAPSULE, DELAYED RELEASE ORAL ONCE A DAY
Qty: 90 CAPSULE | Refills: 3 | Status: ACTIVE | COMMUNITY
Start: 2023-06-30

## 2025-03-26 NOTE — HPI-TODAY'S VISIT:
Here in follow-up for history of possible cirrhosis and a pancreatic IPMN. Last year he has had multiple hospital admissions, including a myocardial infarction requiring cardiac stents, or possible valve repair. He was then subsequently readmitted with abdominal abscesses, perforated viscus possibly colon and currently with a colostomy. He has a diverting colostomy with prolapse, he has been seen by the surgeon but unfortunately we cannot operate on him for a year with him having recent cardiac stents. With regards to history of cirrhosis, this was identified on imaging in 2022, subsequent imaging does not confirm nodular liver. He did have a FibroScan this year which shows only F2 fibrosis, ultrasound shows mild hepatomegaly. He has had no decompensation of liver disease in spite of his severe illnesses and hospital admissions last year. No major confusion identified. No alcohol use reported.

## 2025-04-17 ENCOUNTER — P2P PATIENT RECORD (OUTPATIENT)
Age: 85
End: 2025-04-17

## 2025-04-22 ENCOUNTER — TELEPHONE ENCOUNTER (OUTPATIENT)
Dept: URBAN - METROPOLITAN AREA CLINIC 63 | Facility: CLINIC | Age: 85
End: 2025-04-22

## 2025-04-23 ENCOUNTER — OFFICE VISIT (OUTPATIENT)
Dept: URBAN - METROPOLITAN AREA CLINIC 63 | Facility: CLINIC | Age: 85
End: 2025-04-23

## 2025-04-23 ENCOUNTER — LAB OUTSIDE AN ENCOUNTER (OUTPATIENT)
Dept: URBAN - METROPOLITAN AREA CLINIC 63 | Facility: CLINIC | Age: 85
End: 2025-04-23

## 2025-04-23 RX ORDER — METOPROLOL TARTRATE 100 MG/1
1 TABLET WITH FOOD TABLET, FILM COATED ORAL TWICE A DAY
Status: ACTIVE | COMMUNITY

## 2025-04-23 RX ORDER — CALCIUM CARBONATE/VITAMIN D3 500MG-5MCG
TAKE 1 TABLET BY MOUTH EVERY DAY TABLET ORAL
Qty: 90 EACH | Refills: 1 | Status: ON HOLD | COMMUNITY

## 2025-04-23 RX ORDER — FERROUS SULFATE 324(65)MG
1 TABLET TABLET, DELAYED RELEASE (ENTERIC COATED) ORAL
Status: ACTIVE | COMMUNITY

## 2025-04-23 RX ORDER — GLIPIZIDE 5 MG/1
TABLET ORAL
Qty: 90 TABLET | Status: ACTIVE | COMMUNITY

## 2025-04-23 RX ORDER — TRAZODONE HYDROCHLORIDE 150 MG/1
1 TABLET AT BEDTIME TABLET ORAL ONCE A DAY
Status: ACTIVE | COMMUNITY

## 2025-04-23 RX ORDER — TESTOSTERONE CYPIONATE 200 MG/ML
INJECT 1ML IN THE MUSCLE EVERY 2 WEEKS INJECTION, SOLUTION INTRAMUSCULAR
Qty: 2 MILLILITER | Refills: 2 | Status: ON HOLD | COMMUNITY

## 2025-04-23 RX ORDER — MYCOPHENOLIC ACID 360 MG/1
TAKE 1 TABLET BY MOUTH TWICE DAILY TABLET, DELAYED RELEASE ORAL
Qty: 60 EACH | Refills: 2 | Status: ACTIVE | COMMUNITY

## 2025-04-23 RX ORDER — TORSEMIDE 20 MG/1
AS DIRECTED TABLET ORAL
Status: ON HOLD | COMMUNITY
Start: 2023-06-30

## 2025-04-23 RX ORDER — POTASSIUM CHLORIDE 10 MEQ/1
CAPSULE, COATED, EXTENDED RELEASE ORAL
Qty: 90 CAPSULE | Status: ON HOLD | COMMUNITY

## 2025-04-23 RX ORDER — APIXABAN 2.5 MG/1
AS DIRECTED TABLET, FILM COATED ORAL
Status: ACTIVE | COMMUNITY

## 2025-04-23 RX ORDER — DENOSUMAB 60 MG/ML
AS DIRECTED INJECTION SUBCUTANEOUS
Status: ACTIVE | COMMUNITY

## 2025-04-23 RX ORDER — FAMOTIDINE 10 MG/1
1 TABLET AS NEEDED TABLET, FILM COATED ORAL TWICE A DAY
Status: ACTIVE | COMMUNITY

## 2025-04-23 RX ORDER — DIGOXIN 0.12 MG/1
1 TABLET TABLET ORAL
Status: ACTIVE | COMMUNITY

## 2025-04-23 RX ORDER — ASPIRIN 81 MG/1
TAKE 1 TABLET BY MOUTH EVERY DAY TABLET, COATED ORAL
Qty: 90 EACH | Refills: 0 | Status: ON HOLD | COMMUNITY

## 2025-04-23 RX ORDER — TACROLIMUS 0.5 MG/1
TAKE ONE CAPSULE BY MOUTH TWICE DAILY CAPSULE ORAL
Qty: 60 EACH | Refills: 1 | Status: ACTIVE | COMMUNITY

## 2025-04-23 RX ORDER — DOXAZOSIN 4 MG/1
TAKE 1/2 TABLET BY MOUTH TWICE DAILY TABLET ORAL
Qty: 90 EACH | Refills: 0 | Status: ON HOLD | COMMUNITY

## 2025-04-23 RX ORDER — MAGNESIUM OXIDE 400 MG/1
TAKE 1 TABLET BY MOUTH TWICE DAILY TABLET ORAL
Qty: 60 EACH | Refills: 0 | Status: ON HOLD | COMMUNITY

## 2025-04-23 RX ORDER — PREDNISONE 5 MG/1
TAKE 1 TABLET BY MOUTH EVERY DAY TABLET ORAL
Qty: 30 EACH | Refills: 1 | Status: ACTIVE | COMMUNITY

## 2025-04-23 RX ORDER — OMEPRAZOLE 20 MG/1
1 CAPSULE 30 MINUTES BEFORE MORNING MEAL CAPSULE, DELAYED RELEASE ORAL ONCE A DAY
Qty: 90 CAPSULE | Refills: 3 | Status: ON HOLD | COMMUNITY
Start: 2023-06-30

## 2025-04-23 RX ORDER — ALLOPURINOL 100 MG/1
TAKE ONE TABLET BY MOUTH ONE TIME DAILY TABLET ORAL
Qty: 90 UNSPECIFIED | Refills: 0 | Status: ACTIVE | COMMUNITY

## 2025-04-23 NOTE — HPI-TODAY'S VISIT:
Patient is a very pleasant 84-year-old male who presents for liver follow-up.  He is a patient of Dr. Ricketts.  Last seen in the office 3/26/2025.  Past medical history significant for possible cirrhosis and pancreatic IPMN, MI with cardiac stent and possible valve repair, abdominal abscess perforated viscus possibly colon and currently with colostomy with prolapse seen by surgeon but unfortunately inoperable due to recent cardiac stents.  With regards to history of cirrhosis patient was identified on imaging in 2022 subsequent imaging does not confirm nodular liver.  He had a FibroScan.  With regards to history of cirrhosis patient was identified on imaging in 2022 subsequent imaging does not confirm nodular liver.  He had a FibroScan early 2025 demonstrating F1 fibrosis and ultrasound with mild hepatomegaly.  No decompensation of liver disease despite severe illness and hospital admission last year.  No major confusion identified.  No alcohol use identified.  Due to no decompensated liver disease and LFTs within normal limits doubt diagnosis of cirrhosis.  Recommend rechecking labs in 6 months if stable surveillance of liver disease not warranted given age and comorbidity.  Given history of IPMN however  MRCP was recommended.

## 2025-05-01 ENCOUNTER — TELEPHONE ENCOUNTER (OUTPATIENT)
Dept: URBAN - METROPOLITAN AREA CLINIC 63 | Facility: CLINIC | Age: 85
End: 2025-05-01

## 2025-05-07 PROBLEM — 62484002: Status: ACTIVE | Noted: 2025-05-07

## 2025-05-08 PROBLEM — 297189001: Status: ACTIVE | Noted: 2025-05-08

## 2025-05-16 ENCOUNTER — TELEPHONE ENCOUNTER (OUTPATIENT)
Dept: URBAN - METROPOLITAN AREA CLINIC 63 | Facility: CLINIC | Age: 85
End: 2025-05-16

## 2025-05-30 ENCOUNTER — TELEPHONE ENCOUNTER (OUTPATIENT)
Dept: URBAN - METROPOLITAN AREA CLINIC 60 | Facility: CLINIC | Age: 85
End: 2025-05-30

## 2025-06-03 ENCOUNTER — TELEPHONE ENCOUNTER (OUTPATIENT)
Dept: URBAN - METROPOLITAN AREA CLINIC 63 | Facility: CLINIC | Age: 85
End: 2025-06-03

## 2025-06-19 ENCOUNTER — TELEPHONE ENCOUNTER (OUTPATIENT)
Dept: URBAN - METROPOLITAN AREA CLINIC 63 | Facility: CLINIC | Age: 85
End: 2025-06-19

## 2025-06-23 ENCOUNTER — TELEPHONE ENCOUNTER (OUTPATIENT)
Dept: URBAN - METROPOLITAN AREA CLINIC 63 | Facility: CLINIC | Age: 85
End: 2025-06-23

## 2025-06-25 ENCOUNTER — TELEPHONE ENCOUNTER (OUTPATIENT)
Dept: URBAN - METROPOLITAN AREA CLINIC 63 | Facility: CLINIC | Age: 85
End: 2025-06-25

## 2025-07-24 ENCOUNTER — TELEPHONE ENCOUNTER (OUTPATIENT)
Dept: URBAN - METROPOLITAN AREA CLINIC 63 | Facility: CLINIC | Age: 85
End: 2025-07-24

## 2025-08-06 ENCOUNTER — P2P PATIENT RECORD (OUTPATIENT)
Age: 85
End: 2025-08-06